# Patient Record
Sex: FEMALE | Race: WHITE | NOT HISPANIC OR LATINO | URBAN - METROPOLITAN AREA
[De-identification: names, ages, dates, MRNs, and addresses within clinical notes are randomized per-mention and may not be internally consistent; named-entity substitution may affect disease eponyms.]

---

## 2018-11-21 ENCOUNTER — EMERGENCY (EMERGENCY)
Facility: HOSPITAL | Age: 38
LOS: 1 days | Discharge: HOME | End: 2018-11-21
Attending: EMERGENCY MEDICINE | Admitting: EMERGENCY MEDICINE
Payer: COMMERCIAL

## 2018-11-21 VITALS
SYSTOLIC BLOOD PRESSURE: 127 MMHG | HEART RATE: 76 BPM | WEIGHT: 145.06 LBS | DIASTOLIC BLOOD PRESSURE: 73 MMHG | TEMPERATURE: 98 F | HEIGHT: 63 IN | RESPIRATION RATE: 14 BRPM | OXYGEN SATURATION: 96 %

## 2018-11-21 VITALS
OXYGEN SATURATION: 98 % | HEART RATE: 70 BPM | TEMPERATURE: 98 F | DIASTOLIC BLOOD PRESSURE: 78 MMHG | RESPIRATION RATE: 16 BRPM | SYSTOLIC BLOOD PRESSURE: 114 MMHG

## 2018-11-21 PROCEDURE — 99284 EMERGENCY DEPT VISIT MOD MDM: CPT

## 2018-11-21 PROCEDURE — 99283 EMERGENCY DEPT VISIT LOW MDM: CPT

## 2018-11-21 RX ORDER — ACETAMINOPHEN 500 MG
650 TABLET ORAL ONCE
Qty: 0 | Refills: 0 | Status: COMPLETED | OUTPATIENT
Start: 2018-11-21 | End: 2018-11-21

## 2018-11-21 RX ADMIN — Medication 650 MILLIGRAM(S): at 15:05

## 2018-11-21 RX ADMIN — Medication 650 MILLIGRAM(S): at 15:15

## 2018-11-21 NOTE — ED PROVIDER NOTE - SKIN, MLM
Skin normal color for race, warm, dry and intact. No evidence of rash. +mild erythema/inflammation noted to L forehead, no hematomas noted +mild erythema/inflammation noted to L forehead, no hematomas noted, skin intact
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
## 2018-11-21 NOTE — ED PROVIDER NOTE - ENMT, MLM
+mild erythema/inflammation noted to L forehead, Airway patent, Nasal mucosa clear. Mouth with normal mucosa. Throat has no vesicles, no oropharyngeal exudates and uvula is midline. +mild erythema/inflammation noted to L forehead, no hematomas noted, Airway patent, Nasal mucosa clear. Mouth with normal mucosa. Throat has no vesicles, no oropharyngeal exudates and uvula is midline.
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
## 2018-11-21 NOTE — ED PROVIDER NOTE - CHPI ED SYMPTOMS NEG
no dizziness/no headache/no loss of consciousness/no neck tenderness/no disorientation/no back pain no disorientation/no dizziness/no loss of consciousness/no headache/no neck tenderness
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
## 2018-11-23 RX ORDER — LEVOTHYROXINE SODIUM 125 MCG
1 TABLET ORAL
Qty: 0 | Refills: 0 | COMMUNITY

## 2018-11-24 DIAGNOSIS — S09.90XA UNSPECIFIED INJURY OF HEAD, INITIAL ENCOUNTER: ICD-10-CM

## 2019-07-28 ENCOUNTER — TRANSCRIPTION ENCOUNTER (OUTPATIENT)
Age: 39
End: 2019-07-28

## 2021-01-07 ENCOUNTER — TRANSCRIPTION ENCOUNTER (OUTPATIENT)
Age: 41
End: 2021-01-07

## 2021-02-24 ENCOUNTER — TRANSCRIPTION ENCOUNTER (OUTPATIENT)
Age: 41
End: 2021-02-24

## 2021-02-28 ENCOUNTER — TRANSCRIPTION ENCOUNTER (OUTPATIENT)
Age: 41
End: 2021-02-28

## 2021-08-02 ENCOUNTER — TRANSCRIPTION ENCOUNTER (OUTPATIENT)
Age: 41
End: 2021-08-02

## 2022-09-12 PROBLEM — Z00.00 ENCOUNTER FOR PREVENTIVE HEALTH EXAMINATION: Status: ACTIVE | Noted: 2022-09-12

## 2022-09-13 ENCOUNTER — APPOINTMENT (OUTPATIENT)
Dept: ORTHOPEDIC SURGERY | Facility: CLINIC | Age: 42
End: 2022-09-13

## 2022-09-13 VITALS — HEIGHT: 63 IN | BODY MASS INDEX: 26.22 KG/M2 | WEIGHT: 148 LBS

## 2022-09-13 DIAGNOSIS — M25.519 PAIN IN UNSPECIFIED SHOULDER: ICD-10-CM

## 2022-09-13 DIAGNOSIS — Z78.9 OTHER SPECIFIED HEALTH STATUS: ICD-10-CM

## 2022-09-13 DIAGNOSIS — M75.22 BICIPITAL TENDINITIS, LEFT SHOULDER: ICD-10-CM

## 2022-09-13 PROCEDURE — 73030 X-RAY EXAM OF SHOULDER: CPT | Mod: LT

## 2022-09-13 PROCEDURE — 99214 OFFICE O/P EST MOD 30 MIN: CPT

## 2022-09-13 RX ORDER — INDOMETHACIN 25 MG/1
25 CAPSULE ORAL 3 TIMES DAILY
Qty: 12 | Refills: 0 | Status: ACTIVE | COMMUNITY
Start: 2022-09-13 | End: 1900-01-01

## 2022-09-15 PROBLEM — M75.22 BICEPS TENDINITIS OF LEFT UPPER EXTREMITY: Status: ACTIVE | Noted: 2022-09-13

## 2023-08-25 ENCOUNTER — NON-APPOINTMENT (OUTPATIENT)
Age: 43
End: 2023-08-25

## 2023-10-27 ENCOUNTER — OFFICE (OUTPATIENT)
Dept: URBAN - METROPOLITAN AREA CLINIC 115 | Facility: CLINIC | Age: 43
Setting detail: OPHTHALMOLOGY
End: 2023-10-27
Payer: COMMERCIAL

## 2023-10-27 ENCOUNTER — RX ONLY (RX ONLY)
Age: 43
End: 2023-10-27

## 2023-10-27 DIAGNOSIS — H52.03: ICD-10-CM

## 2023-10-27 DIAGNOSIS — H18.413: ICD-10-CM

## 2023-10-27 DIAGNOSIS — H52.7: ICD-10-CM

## 2023-10-27 DIAGNOSIS — H16.223: ICD-10-CM

## 2023-10-27 PROCEDURE — 92015 DETERMINE REFRACTIVE STATE: CPT | Performed by: OPTOMETRIST

## 2023-10-27 PROCEDURE — 99203 OFFICE O/P NEW LOW 30 MIN: CPT | Performed by: OPTOMETRIST

## 2023-10-27 ASSESSMENT — SPHEQUIV_DERIVED
OS_SPHEQUIV: 0
OD_SPHEQUIV: 0.375
OS_SPHEQUIV: 0
OD_SPHEQUIV: 0.375

## 2023-10-27 ASSESSMENT — CONFRONTATIONAL VISUAL FIELD TEST (CVF)
OS_FINDINGS: FULL
OD_FINDINGS: FULL

## 2023-10-27 ASSESSMENT — TONOMETRY
OD_IOP_MMHG: 18
OS_IOP_MMHG: 18

## 2023-10-27 ASSESSMENT — VISUAL ACUITY
OS_BCVA: 20/20-1
OD_BCVA: 20/20-1

## 2023-10-27 ASSESSMENT — REFRACTION_AUTOREFRACTION
OD_AXIS: 006
OS_SPHERE: +0.25
OD_CYLINDER: -0.25
OS_CYLINDER: -0.50
OD_SPHERE: +0.50
OS_AXIS: 170

## 2023-10-27 ASSESSMENT — REFRACTION_MANIFEST
OS_VA1: 20/20
OS_CYLINDER: -0.50
OS_SPHERE: +0.25
OD_AXIS: 006
OD_CYLINDER: -0.25
OS_AXIS: 170
OD_SPHERE: +0.50
OD_VA1: 20/20

## 2023-10-27 ASSESSMENT — SUPERFICIAL PUNCTATE KERATITIS (SPK)
OS_SPK: T
OD_SPK: T

## 2024-06-06 ENCOUNTER — OFFICE (OUTPATIENT)
Dept: URBAN - METROPOLITAN AREA CLINIC 12 | Facility: CLINIC | Age: 44
Setting detail: OPHTHALMOLOGY
End: 2024-06-06
Payer: COMMERCIAL

## 2024-06-06 DIAGNOSIS — H52.7: ICD-10-CM

## 2024-06-06 PROBLEM — H04.563 STENOSIS LACRIMAL PUNCTUM; BOTH EYES: Status: ACTIVE | Noted: 2024-06-06

## 2024-06-06 PROCEDURE — RX/CHECK RX/CHECK: Performed by: OPHTHALMOLOGY

## 2024-06-06 ASSESSMENT — CONFRONTATIONAL VISUAL FIELD TEST (CVF)
OS_FINDINGS: FULL
OD_FINDINGS: FULL

## 2024-06-23 ENCOUNTER — NON-APPOINTMENT (OUTPATIENT)
Age: 44
End: 2024-06-23

## 2024-09-24 ENCOUNTER — APPOINTMENT (OUTPATIENT)
Dept: OBGYN | Facility: CLINIC | Age: 44
End: 2024-09-24

## 2024-09-24 VITALS
WEIGHT: 153 LBS | HEIGHT: 63 IN | DIASTOLIC BLOOD PRESSURE: 70 MMHG | SYSTOLIC BLOOD PRESSURE: 110 MMHG | BODY MASS INDEX: 27.11 KG/M2

## 2024-09-24 DIAGNOSIS — M35.00 SICCA SYNDROME, UNSPECIFIED: ICD-10-CM

## 2024-09-24 DIAGNOSIS — Z01.419 ENCOUNTER FOR GYNECOLOGICAL EXAMINATION (GENERAL) (ROUTINE) W/OUT ABNORMAL FINDINGS: ICD-10-CM

## 2024-09-24 DIAGNOSIS — L40.9 PSORIASIS, UNSPECIFIED: ICD-10-CM

## 2024-09-24 DIAGNOSIS — N92.6 IRREGULAR MENSTRUATION, UNSPECIFIED: ICD-10-CM

## 2024-09-24 DIAGNOSIS — Z87.828 PERSONAL HISTORY OF OTHER (HEALED) PHYSICAL INJURY AND TRAUMA: ICD-10-CM

## 2024-09-24 DIAGNOSIS — Z30.40 ENCOUNTER FOR SURVEILLANCE OF CONTRACEPTIVES, UNSPECIFIED: ICD-10-CM

## 2024-09-24 DIAGNOSIS — L98.9 DISORDER OF THE SKIN AND SUBCUTANEOUS TISSUE, UNSPECIFIED: ICD-10-CM

## 2024-09-24 DIAGNOSIS — Z80.0 FAMILY HISTORY OF MALIGNANT NEOPLASM OF DIGESTIVE ORGANS: ICD-10-CM

## 2024-09-24 DIAGNOSIS — A63.0 ANOGENITAL (VENEREAL) WARTS: ICD-10-CM

## 2024-09-24 DIAGNOSIS — K90.0 CELIAC DISEASE: ICD-10-CM

## 2024-09-24 DIAGNOSIS — Z12.4 ENCOUNTER FOR SCREENING FOR MALIGNANT NEOPLASM OF CERVIX: ICD-10-CM

## 2024-09-24 DIAGNOSIS — E03.9 HYPOTHYROIDISM, UNSPECIFIED: ICD-10-CM

## 2024-09-24 DIAGNOSIS — Z12.31 ENCOUNTER FOR SCREENING MAMMOGRAM FOR MALIGNANT NEOPLASM OF BREAST: ICD-10-CM

## 2024-09-24 LAB
HCG UR QL: NEGATIVE
QUALITY CONTROL: YES

## 2024-09-24 PROCEDURE — 99386 PREV VISIT NEW AGE 40-64: CPT

## 2024-09-24 PROCEDURE — 81025 URINE PREGNANCY TEST: CPT

## 2024-09-25 PROBLEM — E03.9 HYPOTHYROIDISM, UNSPECIFIED TYPE: Status: ACTIVE | Noted: 2024-09-25

## 2024-09-25 PROBLEM — Z87.828 HISTORY OF INJURY OF NECK: Status: RESOLVED | Noted: 2024-09-25 | Resolved: 2024-09-25

## 2024-09-25 PROBLEM — L40.9 PSORIASIS: Status: ACTIVE | Noted: 2024-09-25

## 2024-09-25 PROBLEM — Z80.0 FAMILY HISTORY OF MALIGNANT NEOPLASM OF COLON: Status: ACTIVE | Noted: 2024-09-25

## 2024-09-25 PROBLEM — A63.0 CONDYLOMA ACUMINATUM IN FEMALE: Status: RESOLVED | Noted: 2024-09-25 | Resolved: 2024-09-25

## 2024-09-25 PROBLEM — L98.9 SKIN LESION: Status: ACTIVE | Noted: 2024-09-25

## 2024-09-25 PROBLEM — M35.00 SJOGREN SYNDROME, UNSPECIFIED: Status: ACTIVE | Noted: 2024-09-25

## 2024-09-25 PROBLEM — K90.0 ADULT CELIAC DISEASE: Status: ACTIVE | Noted: 2024-09-25

## 2024-09-25 LAB — HPV HIGH+LOW RISK DNA PNL CVX: NOT DETECTED

## 2024-09-25 RX ORDER — LEVONORGESTREL 19.5 MG/1
19.5 INTRAUTERINE DEVICE INTRAUTERINE
Refills: 0 | Status: ACTIVE | COMMUNITY

## 2024-09-25 RX ORDER — LEVOTHYROXINE SODIUM 50 UG/1
50 TABLET ORAL
Refills: 0 | Status: ACTIVE | COMMUNITY

## 2024-09-25 NOTE — DISCUSSION/SUMMARY
[FreeTextEntry1] : Well appearing female is here for routine gynecological exam; c/o three periods since August (periods prior to now have been very regular since kyleena IUD insertion 2 years ago), she is also having persistent right sided "hip" pain that is not associated with movement.  She states she is happy with current IUD overall. Also has benign skin lesion on left gluteal area that she may want removed due to discomfort and rubbing.   - PAP done - Will send for labs and pelvic sonogram to evaluate new onset of abnormal bleeding and pain with IUD. Pain management reviewed, ER warnings given.  - Referral to dermatologist provided for skin lesion which is near gluteal fold - healthy diet/exercise/SBE advised  RTO in 1 year for annual gyn exam and prn

## 2024-09-25 NOTE — HISTORY OF PRESENT ILLNESS
[IUD] : has an intrauterine device [Y] : Patient is sexually active [Multiple Partners] : has multiple partners [Patient reported mammogram was normal] : Patient reported mammogram was normal [Patient reported PAP Smear was normal] : Patient reported PAP Smear was normal [Monogamous (Male Partner)] : is monogamous with a male partner [de-identified] : (had had male and female partners in the past) [FreeTextEntry1] : 44-year-old G0 female is here for routine gynecological exam. Patient c/o persistent right hip pain x past few months, pain is described as dull, aching and appears arthritic but travels to her back and does not seem influenced by physical activity, she admits to exercising at the gym daily and does not feel pain, right sided pain appears random and persistent. She also c/o having 3 periods since the beginning of August, had a delayed period in August (was late for 10 days), then had light bleeding for 4-5 days, then had bleeding again 2 weeks later for 4-5 days, reports her last period started 9/14/24 lasting about 4 days. She has Kyleena IUD which was placed in 2022. She admits to being sexually active currently with one male partner. [Mammogramdate] : 2024 [PapSmeardate] : 08/2023 [TextBox_102] : Age at menarche 11yr [LMPDate] : 09/14/24 [MensesFreq] : 27 [PGHxTotal] : 0

## 2024-09-25 NOTE — REVIEW OF SYSTEMS
[Negative] : Heme/Lymph [Dry Eyes] : dry eyes [Abn Vaginal bleeding] : abnormal vaginal bleeding [Arthralgias] : arthralgias [Headache] : headache [Dysuria] : no dysuria [FreeTextEntry4] : Hx of Sjogren's  [FreeTextEntry8] : hx celiac disease

## 2024-09-25 NOTE — PHYSICAL EXAM
[Appropriately responsive] : appropriately responsive [Alert] : alert [No Acute Distress] : no acute distress [Regular Rate Rhythm] : regular rate rhythm [Soft] : soft [Non-tender] : non-tender [Non-distended] : non-distended [No HSM] : No HSM [No Lesions] : no lesions [No Mass] : no mass [Oriented x3] : oriented x3 [Examination Of The Breasts] : a normal appearance [Normal] : normal [No Masses] : no breast masses were palpable [FreeTextEntry5] : respirations even and unlabored, [FreeTextEntry1] : Labia/Clitoris: Normal, no lesions. Vagina: Normal, no lesions visible or palpable. no abnormal discharge Cervix: Smooth, pink, no lesions. No cervical motion tenderness. IUD strings seen, PAP collected Uterus: normal size and position mobile, nontender.  Adnexa: No palpable adnexal masses, nontender bilaterally. fleshed colored raised skin lesion on left rectum c/w skin tag;  left gluteal  area there is a flat, red skin lesion approx 0.3cm, benign appearing

## 2024-09-25 NOTE — HISTORY OF PRESENT ILLNESS
[IUD] : has an intrauterine device [Y] : Patient is sexually active [Multiple Partners] : has multiple partners [Patient reported mammogram was normal] : Patient reported mammogram was normal [Patient reported PAP Smear was normal] : Patient reported PAP Smear was normal [Monogamous (Male Partner)] : is monogamous with a male partner [de-identified] : (had had male and female partners in the past) [FreeTextEntry1] : 44-year-old G0 female is here for routine gynecological exam. Patient c/o persistent right hip pain x past few months, pain is described as dull, aching and appears arthritic but travels to her back and does not seem influenced by physical activity, she admits to exercising at the gym daily and does not feel pain, right sided pain appears random and persistent. She also c/o having 3 periods since the beginning of August, had a delayed period in August (was late for 10 days), then had light bleeding for 4-5 days, then had bleeding again 2 weeks later for 4-5 days, reports her last period started 9/14/24 lasting about 4 days. She has Kyleena IUD which was placed in 2022. She admits to being sexually active currently with one male partner. [Mammogramdate] : 2024 [PapSmeardate] : 08/2023 [TextBox_102] : Age at menarche 11yr [LMPDate] : 09/14/24 [MensesFreq] : 27 [PGHxTotal] : 0

## 2024-09-25 NOTE — HISTORY OF PRESENT ILLNESS
[IUD] : has an intrauterine device [Y] : Patient is sexually active [Multiple Partners] : has multiple partners [Patient reported mammogram was normal] : Patient reported mammogram was normal [Patient reported PAP Smear was normal] : Patient reported PAP Smear was normal [Monogamous (Male Partner)] : is monogamous with a male partner [de-identified] : (had had male and female partners in the past) [FreeTextEntry1] : 44-year-old G0 female is here for routine gynecological exam. Patient c/o persistent right hip pain x past few months, pain is described as dull, aching and appears arthritic but travels to her back and does not seem influenced by physical activity, she admits to exercising at the gym daily and does not feel pain, right sided pain appears random and persistent. She also c/o having 3 periods since the beginning of August, had a delayed period in August (was late for 10 days), then had light bleeding for 4-5 days, then had bleeding again 2 weeks later for 4-5 days, reports her last period started 9/14/24 lasting about 4 days. She has Kyleena IUD which was placed in 2022. She admits to being sexually active currently with one male partner. [Mammogramdate] : 2024 [PapSmeardate] : 08/2023 [TextBox_102] : Age at menarche 11yr [LMPDate] : 09/14/24 [MensesFreq] : 27 [PGHxTotal] : 0